# Patient Record
Sex: FEMALE | Race: WHITE | NOT HISPANIC OR LATINO | URBAN - METROPOLITAN AREA
[De-identification: names, ages, dates, MRNs, and addresses within clinical notes are randomized per-mention and may not be internally consistent; named-entity substitution may affect disease eponyms.]

---

## 2017-03-17 ENCOUNTER — APPOINTMENT (RX ONLY)
Dept: URBAN - METROPOLITAN AREA CLINIC 43 | Facility: CLINIC | Age: 82
Setting detail: DERMATOLOGY
End: 2017-03-17

## 2017-03-17 DIAGNOSIS — L57.0 ACTINIC KERATOSIS: ICD-10-CM | Status: STABLE

## 2017-03-17 DIAGNOSIS — L82.1 OTHER SEBORRHEIC KERATOSIS: ICD-10-CM

## 2017-03-17 PROCEDURE — ? COUNSELING

## 2017-03-17 PROCEDURE — 99213 OFFICE O/P EST LOW 20 MIN: CPT

## 2017-03-17 ASSESSMENT — LOCATION DETAILED DESCRIPTION DERM
LOCATION DETAILED: RIGHT UPPER CUTANEOUS LIP
LOCATION DETAILED: LEFT UPPER CUTANEOUS LIP
LOCATION DETAILED: LEFT POSTERIOR NECK
LOCATION DETAILED: RIGHT POSTERIOR NECK

## 2017-03-17 ASSESSMENT — LOCATION ZONE DERM
LOCATION ZONE: LIP
LOCATION ZONE: NECK

## 2017-03-17 ASSESSMENT — LOCATION SIMPLE DESCRIPTION DERM
LOCATION SIMPLE: LEFT LIP
LOCATION SIMPLE: RIGHT LIP
LOCATION SIMPLE: POSTERIOR NECK

## 2018-11-27 ENCOUNTER — APPOINTMENT (RX ONLY)
Dept: URBAN - METROPOLITAN AREA CLINIC 43 | Facility: CLINIC | Age: 83
Setting detail: DERMATOLOGY
End: 2018-11-27

## 2018-11-27 DIAGNOSIS — L89 PRESSURE ULCER: ICD-10-CM

## 2018-11-27 DIAGNOSIS — L57.0 ACTINIC KERATOSIS: ICD-10-CM | Status: STABLE

## 2018-11-27 DIAGNOSIS — L85.3 XEROSIS CUTIS: ICD-10-CM

## 2018-11-27 DIAGNOSIS — B35.3 TINEA PEDIS: ICD-10-CM

## 2018-11-27 PROBLEM — L89.892 PRESSURE ULCER OF OTHER SITE, STAGE 2: Status: ACTIVE | Noted: 2018-11-27

## 2018-11-27 PROCEDURE — ? PRESCRIPTION

## 2018-11-27 PROCEDURE — ? TREATMENT REGIMEN

## 2018-11-27 PROCEDURE — ? COUNSELING

## 2018-11-27 PROCEDURE — 99213 OFFICE O/P EST LOW 20 MIN: CPT

## 2018-11-27 RX ORDER — KETOCONAZOLE 20 MG/G
CREAM TOPICAL
Qty: 1 | Refills: 6 | Status: ERX | COMMUNITY
Start: 2018-11-27

## 2018-11-27 RX ADMIN — KETOCONAZOLE: 20 CREAM TOPICAL at 00:00

## 2018-11-27 ASSESSMENT — LOCATION ZONE DERM
LOCATION ZONE: LEG
LOCATION ZONE: FEET
LOCATION ZONE: HAND
LOCATION ZONE: LIP

## 2018-11-27 ASSESSMENT — LOCATION DETAILED DESCRIPTION DERM
LOCATION DETAILED: LEFT DORSAL FOOT
LOCATION DETAILED: RIGHT THENAR EMINENCE
LOCATION DETAILED: LEFT THENAR EMINENCE
LOCATION DETAILED: LEFT DISTAL PRETIBIAL REGION
LOCATION DETAILED: RIGHT RADIAL DORSAL HAND
LOCATION DETAILED: LEFT ULNAR DORSAL HAND
LOCATION DETAILED: LEFT MEDIAL PLANTAR MIDFOOT
LOCATION DETAILED: RIGHT DISTAL PRETIBIAL REGION
LOCATION DETAILED: RIGHT MEDIAL PLANTAR MIDFOOT
LOCATION DETAILED: RIGHT UPPER CUTANEOUS LIP

## 2018-11-27 ASSESSMENT — LOCATION SIMPLE DESCRIPTION DERM
LOCATION SIMPLE: RIGHT HAND
LOCATION SIMPLE: LEFT PLANTAR SURFACE
LOCATION SIMPLE: LEFT PRETIBIAL REGION
LOCATION SIMPLE: RIGHT PLANTAR SURFACE
LOCATION SIMPLE: LEFT HAND
LOCATION SIMPLE: LEFT FOOT
LOCATION SIMPLE: RIGHT LIP
LOCATION SIMPLE: RIGHT PRETIBIAL REGION

## 2018-11-27 ASSESSMENT — SEVERITY ASSESSMENT: SEVERITY: MODERATE

## 2018-11-27 NOTE — PROCEDURE: TREATMENT REGIMEN
Detail Level: Zone
Plan: Pressure injury to skin from shoe pressing against the skin\\nAdvise wearing shoes that do not cover this area or more loosely fitting shoes\\nWear thick fluffy socks with shoes that cover the area\\nDiscard shoes that rub over this area \\nUse a hydrocolloid dressing over the affected area for 4-5 days at a time, wash gently with warm soapy water after removing dressing then pat dry gently and reapply fresh dressing, repeat until healed

## 2019-02-12 ENCOUNTER — APPOINTMENT (RX ONLY)
Dept: URBAN - METROPOLITAN AREA CLINIC 43 | Facility: CLINIC | Age: 84
Setting detail: DERMATOLOGY
End: 2019-02-12

## 2019-02-12 DIAGNOSIS — B35.3 TINEA PEDIS: ICD-10-CM | Status: INADEQUATELY CONTROLLED

## 2019-02-12 DIAGNOSIS — L89 PRESSURE ULCER: ICD-10-CM | Status: IMPROVED

## 2019-02-12 PROBLEM — L89.892 PRESSURE ULCER OF OTHER SITE, STAGE 2: Status: ACTIVE | Noted: 2019-02-12

## 2019-02-12 PROCEDURE — 99213 OFFICE O/P EST LOW 20 MIN: CPT

## 2019-02-12 PROCEDURE — ? TREATMENT REGIMEN

## 2019-02-12 PROCEDURE — ? PRESCRIPTION

## 2019-02-12 RX ORDER — ECONAZOLE NITRATE CREAM 10 MG/G
CREAM TOPICAL
Qty: 3 | Refills: 3 | Status: ERX | COMMUNITY
Start: 2019-02-12

## 2019-02-12 RX ADMIN — ECONAZOLE NITRATE CREAM: 10 CREAM TOPICAL at 00:00

## 2019-02-12 ASSESSMENT — LOCATION SIMPLE DESCRIPTION DERM
LOCATION SIMPLE: LEFT FOOT
LOCATION SIMPLE: LEFT PLANTAR SURFACE

## 2019-02-12 ASSESSMENT — LOCATION ZONE DERM: LOCATION ZONE: FEET

## 2019-02-12 ASSESSMENT — LOCATION DETAILED DESCRIPTION DERM
LOCATION DETAILED: LEFT PLANTAR FOREFOOT OVERLYING 2ND METATARSAL
LOCATION DETAILED: LEFT DORSAL FOOT

## 2019-02-12 NOTE — PROCEDURE: TREATMENT REGIMEN
Plan: Tinea pedis with onychomycosis (nail involvement) \\n- had bright red reaction to use of ketoconazole so stopped\\n- will try using alternative AF medication instead \\n- recheck again at next visit
Detail Level: Zone
Plan: Pressure injury to skin from shoe pressing against the skin\\n- now nearly completely healed with small residual scaling papule at center\\n- continue pressure avoidsance over area\\n- fine to continue hydrocolloid dressing until fully healed (sample given), can repeat every 4-5 days

## 2019-04-08 ENCOUNTER — APPOINTMENT (RX ONLY)
Dept: URBAN - METROPOLITAN AREA CLINIC 43 | Facility: CLINIC | Age: 84
Setting detail: DERMATOLOGY
End: 2019-04-08

## 2019-04-08 DIAGNOSIS — L90.0 LICHEN SCLEROSUS ET ATROPHICUS: ICD-10-CM

## 2019-04-08 DIAGNOSIS — B35.3 TINEA PEDIS: ICD-10-CM

## 2019-04-08 DIAGNOSIS — L82.0 INFLAMED SEBORRHEIC KERATOSIS: ICD-10-CM

## 2019-04-08 PROCEDURE — 99213 OFFICE O/P EST LOW 20 MIN: CPT | Mod: 25

## 2019-04-08 PROCEDURE — ? PRESCRIPTION

## 2019-04-08 PROCEDURE — ? BENIGN DESTRUCTION

## 2019-04-08 PROCEDURE — 17110 DESTRUCTION B9 LES UP TO 14: CPT

## 2019-04-08 PROCEDURE — ? TREATMENT REGIMEN

## 2019-04-08 PROCEDURE — ? COUNSELING

## 2019-04-08 RX ORDER — TRIAMCINOLONE ACETONIDE 0.1 %
OINTMENT (GRAM) TOPICAL
Qty: 1 | Refills: 2 | Status: ERX | COMMUNITY
Start: 2019-04-08

## 2019-04-08 RX ADMIN — Medication: at 23:41

## 2019-04-08 ASSESSMENT — LOCATION ZONE DERM
LOCATION ZONE: NECK
LOCATION ZONE: VULVA
LOCATION ZONE: FEET
LOCATION ZONE: TRUNK

## 2019-04-08 ASSESSMENT — LOCATION DETAILED DESCRIPTION DERM
LOCATION DETAILED: LEFT LABIA MAJORA
LOCATION DETAILED: RIGHT LABIA MAJORA
LOCATION DETAILED: LEFT MEDIAL TRAPEZIAL NECK
LOCATION DETAILED: RIGHT PLANTAR FOREFOOT OVERLYING 2ND METATARSAL
LOCATION DETAILED: LEFT BUTTOCK
LOCATION DETAILED: RIGHT BUTTOCK
LOCATION DETAILED: RIGHT MEDIAL TRAPEZIAL NECK
LOCATION DETAILED: LEFT PLANTAR FOREFOOT OVERLYING 3RD METATARSAL

## 2019-04-08 ASSESSMENT — LOCATION SIMPLE DESCRIPTION DERM
LOCATION SIMPLE: BUTTOCK
LOCATION SIMPLE: POSTERIOR NECK
LOCATION SIMPLE: LEFT PLANTAR SURFACE
LOCATION SIMPLE: RIGHT PLANTAR SURFACE
LOCATION SIMPLE: VULVA

## 2019-04-08 NOTE — PROCEDURE: BENIGN DESTRUCTION
Add 52 Modifier (Optional): no
Treatment Number (Will Not Render If 0): 0
Consent: The patient's consent was obtained including but not limited to risks of crusting, scabbing, blistering, scarring, darker or lighter pigmentary change, recurrence, incomplete removal and infection.
Post-Care Instructions: I reviewed with the patient in detail post-care instructions. Patient is to wear sunprotection, and avoid picking at any of the treated lesions. Pt may apply Vaseline to crusted or scabbing areas.
Medical Necessity Information: It is in your best interest to select a reason for this procedure from the list below. All of these items fulfill various CMS LCD requirements except the new and changing color options.
Anesthesia Volume In Cc: 0.5
Medical Necessity Clause: This procedure was medically necessary because the lesion that was treated was
Detail Level: Detailed

## 2021-04-08 ENCOUNTER — APPOINTMENT (RX ONLY)
Dept: URBAN - METROPOLITAN AREA CLINIC 43 | Facility: CLINIC | Age: 86
Setting detail: DERMATOLOGY
End: 2021-04-08

## 2021-04-08 DIAGNOSIS — L82.1 OTHER SEBORRHEIC KERATOSIS: ICD-10-CM

## 2021-04-08 DIAGNOSIS — L82.0 INFLAMED SEBORRHEIC KERATOSIS: ICD-10-CM

## 2021-04-08 DIAGNOSIS — L81.4 OTHER MELANIN HYPERPIGMENTATION: ICD-10-CM

## 2021-04-08 PROCEDURE — 99213 OFFICE O/P EST LOW 20 MIN: CPT | Mod: 25

## 2021-04-08 PROCEDURE — ? COUNSELING

## 2021-04-08 PROCEDURE — ? LIQUID NITROGEN

## 2021-04-08 PROCEDURE — 17110 DESTRUCTION B9 LES UP TO 14: CPT

## 2021-04-08 PROCEDURE — ? OBSERVATION

## 2021-04-08 ASSESSMENT — LOCATION DETAILED DESCRIPTION DERM
LOCATION DETAILED: RIGHT PROXIMAL DORSAL FOREARM
LOCATION DETAILED: LEFT INFERIOR POSTERIOR NECK
LOCATION DETAILED: LEFT INFERIOR ANTERIOR NECK
LOCATION DETAILED: LEFT PROXIMAL DORSAL FOREARM
LOCATION DETAILED: RIGHT MEDIAL TRAPEZIAL NECK

## 2021-04-08 ASSESSMENT — LOCATION ZONE DERM
LOCATION ZONE: ARM
LOCATION ZONE: NECK

## 2021-04-08 ASSESSMENT — LOCATION SIMPLE DESCRIPTION DERM
LOCATION SIMPLE: LEFT FOREARM
LOCATION SIMPLE: RIGHT FOREARM
LOCATION SIMPLE: LEFT ANTERIOR NECK
LOCATION SIMPLE: POSTERIOR NECK

## 2021-04-08 NOTE — PROCEDURE: LIQUID NITROGEN
Render Note In Bullet Format When Appropriate: No
Post-Care Instructions: I reviewed with the patient in detail post-care instructions. Patient is to wear sunprotection, and avoid picking at any of the treated lesions. Pt may apply Vaseline to crusted or scabbing areas.
Duration Of Freeze Thaw-Cycle (Seconds): 3
Consent: The patient's consent was obtained including but not limited to risks of crusting, scabbing, blistering, scarring, darker or lighter pigmentary change, recurrence, incomplete removal and infection.
Medical Necessity Information: It is in your best interest to select a reason for this procedure from the list below. All of these items fulfill various CMS LCD requirements except the new and changing color options.
Number Of Freeze-Thaw Cycles: 1 freeze-thaw cycle
Medical Necessity Clause: This procedure was medically necessary because the lesions that were treated were:
Detail Level: Simple